# Patient Record
Sex: MALE | Race: WHITE | NOT HISPANIC OR LATINO | Employment: FULL TIME | URBAN - METROPOLITAN AREA
[De-identification: names, ages, dates, MRNs, and addresses within clinical notes are randomized per-mention and may not be internally consistent; named-entity substitution may affect disease eponyms.]

---

## 2022-08-06 ENCOUNTER — HOSPITAL ENCOUNTER (EMERGENCY)
Facility: HOSPITAL | Age: 42
Discharge: HOME | End: 2022-08-06
Attending: EMERGENCY MEDICINE
Payer: COMMERCIAL

## 2022-08-06 ENCOUNTER — APPOINTMENT (EMERGENCY)
Dept: RADIOLOGY | Facility: HOSPITAL | Age: 42
End: 2022-08-06
Payer: COMMERCIAL

## 2022-08-06 ENCOUNTER — APPOINTMENT (EMERGENCY)
Dept: CT IMAGING | Facility: HOSPITAL | Age: 42
End: 2022-08-06
Payer: COMMERCIAL

## 2022-08-06 VITALS
RESPIRATION RATE: 18 BRPM | TEMPERATURE: 98.7 F | HEART RATE: 123 BPM | DIASTOLIC BLOOD PRESSURE: 99 MMHG | OXYGEN SATURATION: 95 % | SYSTOLIC BLOOD PRESSURE: 167 MMHG

## 2022-08-06 DIAGNOSIS — F10.929 ALCOHOL INTOXICATION (HCC): Primary | ICD-10-CM

## 2022-08-06 DIAGNOSIS — S09.90XA CLOSED HEAD INJURY, INITIAL ENCOUNTER: ICD-10-CM

## 2022-08-06 PROCEDURE — G1004 CDSM NDSC: HCPCS

## 2022-08-06 PROCEDURE — 90715 TDAP VACCINE 7 YRS/> IM: CPT | Performed by: EMERGENCY MEDICINE

## 2022-08-06 PROCEDURE — 99284 EMERGENCY DEPT VISIT MOD MDM: CPT | Performed by: EMERGENCY MEDICINE

## 2022-08-06 PROCEDURE — 72125 CT NECK SPINE W/O DYE: CPT

## 2022-08-06 PROCEDURE — 90471 IMMUNIZATION ADMIN: CPT

## 2022-08-06 PROCEDURE — 70450 CT HEAD/BRAIN W/O DYE: CPT

## 2022-08-06 PROCEDURE — 71045 X-RAY EXAM CHEST 1 VIEW: CPT

## 2022-08-06 PROCEDURE — 99284 EMERGENCY DEPT VISIT MOD MDM: CPT

## 2022-08-06 RX ADMIN — TETANUS TOXOID, REDUCED DIPHTHERIA TOXOID AND ACELLULAR PERTUSSIS VACCINE, ADSORBED 0.5 ML: 5; 2.5; 8; 8; 2.5 SUSPENSION INTRAMUSCULAR at 20:34

## 2022-08-06 NOTE — ED PROVIDER NOTES
History  Chief Complaint   Patient presents with    Alcohol Intoxication     Patient arrives via ems c/o head pain, etoh use at [facility], admits to using weed today  [place] down [place]  Denies loc, denies thinners     39 YO male presents after a mechanical fall  Pt was at a festival, states some marijuana and alcohol today  Pt fell over an embankment, struck his head  Pt sustained scrapes over the B/L arms  He denies LOC, does not take anticoagulation  Pt has been able to ambulate some since the accident  Pt denies CP/SOB/F/C/N/V/D/C, no dysuria, burning on urination or blood in urine  History provided by:  Patient and parent   used: No    Fall  Mechanism of injury: fall    Injury location:  Head/neck and shoulder/arm  Shoulder/arm injury location:  R elbow, L forearm and L hand  Arrived directly from scene: yes    Fall:     Fall occurred: Down embankment  Point of impact:  Head  Suspicion of alcohol use: yes    Suspicion of drug use: yes    Tetanus status:  Unknown  Prior to arrival data:     Blood loss:  None    Responsiveness at scene:  Alert    Orientation at scene:  Person and place    Loss of consciousness: no    Associated symptoms: no abdominal pain, no chest pain, no nausea, no neck pain and no vomiting        None       Past Medical History:   Diagnosis Date    Asthma        History reviewed  No pertinent surgical history  History reviewed  No pertinent family history  I have reviewed and agree with the history as documented  E-Cigarette/Vaping     E-Cigarette/Vaping Substances     Social History     Tobacco Use    Smoking status: Current Every Day Smoker     Types: Cigarettes    Smokeless tobacco: Never Used   Substance Use Topics    Alcohol use: Yes    Drug use: Yes     Types: Marijuana       Review of Systems   Constitutional: Negative for fever  HENT: Negative for dental problem  Eyes: Negative for visual disturbance     Respiratory: Negative for shortness of breath  Cardiovascular: Negative for chest pain  Gastrointestinal: Negative for abdominal pain, nausea and vomiting  Genitourinary: Negative for dysuria and frequency  Musculoskeletal: Negative for neck pain and neck stiffness  Skin: Negative for rash  Neurological: Negative for dizziness, weakness and light-headedness  Psychiatric/Behavioral: Negative for agitation, behavioral problems and confusion  All other systems reviewed and are negative  Physical Exam  Physical Exam  Vitals and nursing note reviewed  Constitutional:       Appearance: He is well-developed  HENT:      Head: Normocephalic  Comments: Hematoma to the Right forehead  Eyes:      Extraocular Movements: Extraocular movements intact  Cardiovascular:      Rate and Rhythm: Normal rate  Pulmonary:      Effort: Pulmonary effort is normal    Abdominal:      General: There is no distension  Musculoskeletal:         General: Normal range of motion  Cervical back: Normal range of motion  Comments: Superficial abrasions over the Left elbow, Right forearm and hand  Skin:     Findings: No rash  Neurological:      Mental Status: He is alert and oriented to person, place, and time     Psychiatric:         Behavior: Behavior normal          Vital Signs  ED Triage Vitals [08/06/22 1912]   Temperature Pulse Respirations Blood Pressure SpO2   98 7 °F (37 1 °C) (!) 123 18 167/99 95 %      Temp Source Heart Rate Source Patient Position - Orthostatic VS BP Location FiO2 (%)   Oral Monitor Lying Right arm --      Pain Score       --           Vitals:    08/06/22 1912   BP: 167/99   Pulse: (!) 123   Patient Position - Orthostatic VS: Lying         Visual Acuity      ED Medications  Medications   tetanus-diphtheria-acellular pertussis (BOOSTRIX) IM injection 0 5 mL (0 5 mL Intramuscular Given 8/6/22 2034)       Diagnostic Studies  Results Reviewed     None                 CT head without contrast   Final Result by Lorraine Reina Sil Lynch MD (08/06 2010)      No acute intracranial abnormality  Workstation performed: [id number]         CT cervical spine without contrast   Final Result by Mary Chaudhry MD (08/06 2013)      No cervical spine fracture or traumatic malalignment  Workstation performed: [id number]         XR chest 1 view portable    (Results Pending)              Procedures  Procedures         ED Course                                             MDM  Number of Diagnoses or Management Options  Alcohol intoxication ([facility] [address] ): new and requires workup  Closed head injury, initial encounter: new and requires workup  Diagnosis management comments: 1  Fall - Pt intoxicated, fell down embankment  Will CT head and C/Spine  Pt states his father can come pick him up  Amount and/or Complexity of Data Reviewed  Tests in the radiology section of CPT®: reviewed and ordered  Obtain history from someone other than the patient: yes  Independent visualization of images, tracings, or specimens: yes    Patient Progress  Patient progress: stable      Disposition  Final diagnoses:   Alcohol intoxication ([facility] [address] )   Closed head injury, initial encounter     Time reflects when diagnosis was documented in both MDM as applicable and the Disposition within this note     Time User Action Codes Description Comment    8/6/2022  8:30 PM Ruben SILVA Add [F10 929] Alcohol intoxication ([facility] [address] )     8/6/2022  8:30 PM Ruben SILVA Add [S09 90XA] Closed head injury, initial encounter       ED Disposition     ED Disposition   Discharge    Condition   Stable    Date/Time   Sat Aug 6, 2022  8:30 PM    Comment   Michelle Barry discharge to home/self care  Follow-up Information    None         There are no discharge medications for this patient  No discharge procedures on file      PDMP Review     None          ED Provider  Electronically Signed by           Ousmane Guidry MD  08/06/22 4207